# Patient Record
Sex: MALE | Race: WHITE
[De-identification: names, ages, dates, MRNs, and addresses within clinical notes are randomized per-mention and may not be internally consistent; named-entity substitution may affect disease eponyms.]

---

## 2018-03-04 ENCOUNTER — HOSPITAL ENCOUNTER (EMERGENCY)
Dept: HOSPITAL 56 - MW.ED | Age: 38
Discharge: HOME | End: 2018-03-04
Payer: COMMERCIAL

## 2018-03-04 DIAGNOSIS — E66.9: ICD-10-CM

## 2018-03-04 DIAGNOSIS — R04.0: Primary | ICD-10-CM

## 2018-03-04 NOTE — EDM.PDOC
ED HPI GENERAL MEDICAL PROBLEM





- General


Chief Complaint: ENT Problem


Stated Complaint: BLEEDING NOSE


Time Seen by Provider: 03/04/18 04:30


Source of Information: Reports: Patient





- History of Present Illness


INITIAL COMMENTS - FREE TEXT/NARRATIVE: 





HISTORY AND PHYSICAL:


History of present illness:


[Patient was at work tonight striking an object with a 6 pound hammer as a 

raise to hammer he struck himself in the nose he had bleeding from both nares, 

he presents as such bleeding has resolved





No fever nausea vomiting chills sweats pain is 2 out of 10]


Review of systems: 


As per history of present illness and below otherwise all systems reviewed and 

negative.


Past medical history: 


As per history of present illness and as reviewed below otherwise 

noncontributory.


Surgical history: 


As per history of present illness and as reviewed below otherwise 

noncontributory.


Social history: 


No reported history of drug or alcohol abuse.


Family history: 


As per history of present illness and as reviewed below otherwise 

noncontributory.


Physical exam:


HEENT: Atraumatic, normocephalic, pupils reactive, negative for conjunctival 

pallor or scleral icterus, mucous membranes moist, throat clear, neck supple, 

nontender, trachea midline. Tender over the nasal bridge dried blood in both 

nares both nares are patent dentition intact no bruising


Lungs: Clear to auscultation, breath sounds equal bilaterally, chest nontender.


Heart: S1S2, regular, negative for clicks, rubs, or JVD.


Abdomen: Soft, nondistended, nontender. Negative for masses or 

hepatosplenomegaly. Negative for costovertebral tenderness.


Pelvis: Stable nontender.


Genitourinary: Deferred.


Rectal: Deferred.


Extremities: Atraumatic, negative for cords or calf pain. Neurovascular 

unremarkable.


Neuro: Awake, alert, oriented. Cranial nerves II through XII unremarkable. 

Cerebellum unremarkable. Motor and sensory unremarkable throughout. Exam 

nonfocal.


Diagnostics:


[Nasal bones]


Therapeutics:


[]Rest ice ibuprofen


Follow-up with plastics when necessary


Impression: 


[Epistaxis resolved]


Definitive disposition and diagnosis as appropriate pending reevaluation and 

review of above.


  ** nose


Pain Score (Numeric/FACES): 8





- Related Data


 Allergies











Allergy/AdvReac Type Severity Reaction Status Date / Time


 


No Known Allergies Allergy   Verified 03/04/18 04:11











Home Meds: 


 Home Meds





. [No Known Home Meds]  03/04/18 [History]











Past Medical History


Musculoskeletal History: Reports: Back Pain, Chronic


Endocrine/Metabolic History: Reports: Obesity/BMI 30+





Social & Family History





- Family History


Family Medical History: Noncontributory





- Tobacco Use


Smoking Status *Q: Never Smoker





- Recreational Drug Use


Recreational Drug Use: No





ED ROS GENERAL





- Review of Systems


Review Of Systems: ROS reveals no pertinent complaints other than HPI.





ED EXAM, GENERAL





- Physical Exam


Exam: See Below





Course





- Vital Signs


Last Recorded V/S: 


 Last Vital Signs











Temp  98 F   03/04/18 04:07


 


Pulse  113 H  03/04/18 04:07


 


Resp  18   03/04/18 04:07


 


BP  128/91 H  03/04/18 04:07


 


Pulse Ox  94 L  03/04/18 04:07














- Orders/Labs/Meds


Orders: 


 Active Orders 24 hr











 Category Date Time Status


 


 Nasal Bone Min 3V [CR] Stat Exams  03/04/18 04:29 Ordered














Departure





- Departure


Time of Disposition: 05:27


Disposition: Home, Self-Care 01


Condition: Good


Clinical Impression: 


 Epistaxis








- Discharge Information


Referrals: 


PCP,None [Primary Care Provider] - 


Forms:  ED Department Discharge


Additional Instructions: 


Ice 20 minute intervals 3 times daily as needed


Ibuprofen 400 mg 3 times daily 7-10 days


Return if symptoms persist or worsen


Follow-up with plastic surgery, approximately 10 days as needed call for 

appointment for appropriate follow-up





[facility] - Plastic Surgery


[address], [address] 


[place] [address]


Phone: [phone]


Fax: [phone]





The following information is given to patients seen in the emergency department 

who are being discharged to home. This information is to outline your options 

for follow-up care. We provide all patients seen in our emergency department 

with a follow-up referral.





The need for follow-up, as well as the timing and circumstances, are variable 

depending upon the specifics of your emergency department visit.





If you don't have a primary care physician on staff, we will provide you with a 

referral. We always advise you to contact your personal physician following an 

emergency department visit to inform them of the circumstance of the visit and 

for follow-up with them and/or the need for any referrals to a consulting 

specialist.





The emergency department will also refer you to a specialist when appropriate. 

This referral assures that you have the opportunity for follow-up care with a 

specialist. All of these measure are taken in an effort to provide you with 

optimal care, which includes your follow-up.





Under all circumstances we always encourage you to contact your private 

physician who remains a resource for coordinating your care. When calling for 

follow-up care, please make the office aware that this follow-up is from your 

recent emergency room visit. If for any reason you are refused follow-up, 

please contact the [facility] emergency department at [phone] 

and asked to speak to the emergency department charge nurse.








- My Orders


Last 24 Hours: 


My Active Orders





03/04/18 04:29


Nasal Bone Min 3V [CR] Stat 














- Assessment/Plan


Last 24 Hours: 


My Active Orders





03/04/18 04:29


Nasal Bone Min 3V [CR] Stat

## 2018-03-05 NOTE — CR
EXAM DATE: 18



PATIENT'S AGE: 37





Patient: KATIE CARVALHO



Facility: [place], [place]

Patient ID: [id number]

Site Patient ID: [id number].

Site Accession #: [id number].

: 1980

Study: XRay Head NASAL BONE [id number]/4/2018 4:54:03 AM

Ordering Physician: Doctor Murillo



Final Report: 

Indication:

Injury



Technique:

Three views of the nasal bone



Comparison:

None available



Findings/Impression :

A nonacute appearing ovoid lucency in the superior aspect of the nasal septum, 
nonspecific. Otherwise no displaced nasal bone fractures seen. A lucency at the 
maxillary nasal spine could be developmental. Grossly intact orbital rims. 
Maxillary sinus air-fluid levels are not excluded. Correlate clinically, and if 
indicated, consider followup imaging evaluation.



Dictated by Klever Diamond MD @ 3/4/2018 5:05:29 AM





Dictated by: Klever Diamond MD @ 2018 05:05:33

(Electronic Signature)



Report Signed by Proxy.
EMILY